# Patient Record
Sex: MALE | Race: WHITE | NOT HISPANIC OR LATINO | Employment: FULL TIME | URBAN - METROPOLITAN AREA
[De-identification: names, ages, dates, MRNs, and addresses within clinical notes are randomized per-mention and may not be internally consistent; named-entity substitution may affect disease eponyms.]

---

## 2020-12-07 ENCOUNTER — OFFICE VISIT (OUTPATIENT)
Dept: FAMILY MEDICINE CLINIC | Facility: CLINIC | Age: 35
End: 2020-12-07

## 2020-12-07 VITALS
RESPIRATION RATE: 16 BRPM | BODY MASS INDEX: 24.62 KG/M2 | TEMPERATURE: 97.3 F | OXYGEN SATURATION: 97 % | HEART RATE: 78 BPM | HEIGHT: 70 IN | DIASTOLIC BLOOD PRESSURE: 76 MMHG | WEIGHT: 172 LBS | SYSTOLIC BLOOD PRESSURE: 123 MMHG

## 2020-12-07 DIAGNOSIS — Z72.821 INADEQUATE SLEEP HYGIENE: ICD-10-CM

## 2020-12-07 DIAGNOSIS — G47.10 HYPERSOMNIA: ICD-10-CM

## 2020-12-07 DIAGNOSIS — R07.89 FEELING OF CHEST TIGHTNESS: ICD-10-CM

## 2020-12-07 DIAGNOSIS — G47.8 NON-RESTORATIVE SLEEP: ICD-10-CM

## 2020-12-07 DIAGNOSIS — Z23 NEED FOR INFLUENZA VACCINATION: ICD-10-CM

## 2020-12-07 DIAGNOSIS — Z00.00 ANNUAL PHYSICAL EXAM: Primary | ICD-10-CM

## 2020-12-07 PROCEDURE — 90686 IIV4 VACC NO PRSV 0.5 ML IM: CPT | Performed by: FAMILY MEDICINE

## 2020-12-07 PROCEDURE — 99385 PREV VISIT NEW AGE 18-39: CPT | Performed by: FAMILY MEDICINE

## 2020-12-07 PROCEDURE — 90471 IMMUNIZATION ADMIN: CPT | Performed by: FAMILY MEDICINE

## 2020-12-07 PROCEDURE — 93000 ELECTROCARDIOGRAM COMPLETE: CPT | Performed by: FAMILY MEDICINE

## 2020-12-07 RX ORDER — IBUPROFEN 200 MG
200 TABLET ORAL EVERY 6 HOURS PRN
COMMUNITY

## 2020-12-07 NOTE — PROGRESS NOTES
Subjective   Holland Lozano is a 35 y.o. male.     History of Present Illness     35-year-old male who is a new patient to me presents today for annual physical exam.    Diet/Exercise: Diet pretty balanced; active at work     Social history: Former smoker who quit in 2014; no alcohol or drug use; h/o opioid use in 2015 for 5-6 years total; quit in 2015.     Sexual history: Not currently sexually active; when active is active with women; no partners in the last year; no concern for STD testing     Chest pain/tightness x 3 weeks; L sided; thinks has to do with caffeine. Also drinks energy drinks once a day 8 oz of Red Bull. Was also drinking Monster drinks at time. Thinks he overdid it. Stopped energy drinks about 1.5 weeks ago; still drinking coffee. Drinking 2 cups of coffee in the morning only now. However still having chest tightness. For the past couple weeks its been a couple times a day; unsure of triggers. Works at [employer]; takes concrete steps to get exercise; the other morning he walked up the steps 10 flights and then had palpitations and some chest tightness. Episodes can last up to a couple of hours. Does not ever wake from sleep.     Sleep: Bedtime - 3998-3014, sleep latency - up to 1 hour; computer and TV in bedroom, wake time - 0530, hours slept: 4-6 hours during the week and sleeps longer on the weekend, non restorative sleep - Y, snoring - Y, witnessed apneas - N, nighttime awakening secondary to coughing/choking/gasping for air-N, kicking legs-N, morning headaches-Y, dry mouth-Y, urge sensations-N, excessive sweating-N, teeth grinding-Y, feel more sleepy after getting more sleep-Y, family history of ZACHARY-N.      No flowsheet data found.    The following portions of the patient's history were reviewed and updated as appropriate: allergies, current medications, past family history, past medical history, past social history, past surgical history and problem list.    Review of Systems    Constitutional: Negative for activity change, appetite change, chills and fever.   HENT: Negative for congestion, ear pain, sinus pressure and sore throat.    Respiratory: Negative for cough and shortness of breath.    Cardiovascular: Negative for chest pain.   Gastrointestinal: Negative for abdominal pain, blood in stool, constipation and diarrhea.   Musculoskeletal: Negative for back pain.   Psychiatric/Behavioral: Negative for stress. The patient is not nervous/anxious.        Objective   Physical Exam  Constitutional:       Appearance: He is well-developed.   HENT:      Head: Normocephalic and atraumatic.      Right Ear: External ear normal.      Left Ear: External ear normal.      Nose: Nose normal.   Eyes:      Conjunctiva/sclera: Conjunctivae normal.      Pupils: Pupils are equal, round, and reactive to light.   Neck:      Musculoskeletal: Normal range of motion and neck supple.   Cardiovascular:      Rate and Rhythm: Normal rate and regular rhythm.      Heart sounds: No murmur.   Pulmonary:      Effort: Pulmonary effort is normal. No respiratory distress.      Breath sounds: Normal breath sounds. No stridor. No wheezing or rales.   Chest:      Chest wall: No tenderness.   Abdominal:      General: Bowel sounds are normal. There is no distension.      Palpations: Abdomen is soft.      Tenderness: There is no abdominal tenderness.   Musculoskeletal: Normal range of motion.   Skin:     General: Skin is warm.   Neurological:      Mental Status: He is alert and oriented to person, place, and time.   Psychiatric:         Behavior: Behavior normal.       ECG 12 Lead    Date/Time: 12/7/2020 11:59 AM  Performed by: Steffen Robles MD  Authorized by: Steffen Robles MD   Comparison: not compared with previous ECG   Previous ECG: no previous ECG available  Rhythm: sinus rhythm  Rate: normal  Conduction: conduction normal  QRS axis: normal    Clinical impression: normal ECG                      Assessment/Plan      Diagnoses and all orders for this visit:    1. Annual physical exam (Primary)  -     CBC & Differential  -     Comprehensive Metabolic Panel  -     Hemoglobin A1c  -     Lipid Panel  -     TSH Rfx On Abnormal To Free T4  -     Chlamydia trachomatis, Neisseria gonorrhoeae, PCR - Urine, Urine, Clean Catch  -     RPR, Rfx Qn RPR / Confirm TP  -     HIV-1 / O / 2 Ag / Antibody 4th Generation  -     HSV 1 & 2 - Specific Antibody, IgG  -     ECG 12 Lead    2. Hypersomnia  -     Home Sleep Study; Future    3. Non-restorative sleep  -     Home Sleep Study; Future    4. Feeling of chest tightness  -     ECG 12 Lead    5. Inadequate sleep hygiene    6. Need for influenza vaccination  -     Fluarix/Fluzone/Afluria Quad>6 Months    Decrease caffeine intake, increase water intake, increase hours of sleep, improve sleep hygiene with no electronics in bedroom.    Low glycemic index diet  Exercise 30 minutes most days of the week  Make sure you get results on any labs or tests we ordered today  We discussed medications and how to take them as prescribed  Sleep 6-8 hours each night if possible  If you have not signed up for [employer], please activate your code ASAP from your After Visit Summary today    LDL goal <100  LDL goal if heart disease <70  HDL goal >60  Triglyceride goal <150  BP goal =<130/80  Fasting glucose <100

## 2020-12-11 LAB
C TRACH RRNA SPEC QL NAA+PROBE: NEGATIVE
N GONORRHOEA RRNA SPEC QL NAA+PROBE: NEGATIVE

## 2020-12-15 LAB
ALBUMIN SERPL-MCNC: 5 G/DL (ref 4–5)
ALBUMIN/GLOB SERPL: 2 {RATIO} (ref 1.2–2.2)
ALP SERPL-CCNC: 69 IU/L (ref 39–117)
ALT SERPL-CCNC: 20 IU/L (ref 0–44)
AST SERPL-CCNC: 20 IU/L (ref 0–40)
BASOPHILS # BLD AUTO: 0.1 X10E3/UL (ref 0–0.2)
BASOPHILS NFR BLD AUTO: 1 %
BILIRUB SERPL-MCNC: 0.5 MG/DL (ref 0–1.2)
BUN SERPL-MCNC: 12 MG/DL (ref 6–20)
BUN/CREAT SERPL: 14 (ref 9–20)
CALCIUM SERPL-MCNC: 9.6 MG/DL (ref 8.7–10.2)
CHLORIDE SERPL-SCNC: 101 MMOL/L (ref 96–106)
CHOLEST SERPL-MCNC: 176 MG/DL (ref 100–199)
CO2 SERPL-SCNC: 27 MMOL/L (ref 20–29)
CREAT SERPL-MCNC: 0.83 MG/DL (ref 0.76–1.27)
EOSINOPHIL # BLD AUTO: 0.1 X10E3/UL (ref 0–0.4)
EOSINOPHIL NFR BLD AUTO: 2 %
ERYTHROCYTE [DISTWIDTH] IN BLOOD BY AUTOMATED COUNT: 12.4 % (ref 11.6–15.4)
GLOBULIN SER CALC-MCNC: 2.5 G/DL (ref 1.5–4.5)
GLUCOSE SERPL-MCNC: 80 MG/DL (ref 65–99)
HBA1C MFR BLD: 5.1 % (ref 4.8–5.6)
HCT VFR BLD AUTO: 42.7 % (ref 37.5–51)
HDLC SERPL-MCNC: 60 MG/DL
HGB BLD-MCNC: 14 G/DL (ref 13–17.7)
HIV 1+2 AB+HIV1 P24 AG SERPL QL IA: NON REACTIVE
HSV1 IGG SER IA-ACNC: <0.91 INDEX (ref 0–0.9)
HSV2 IGG SER IA-ACNC: <0.91 INDEX (ref 0–0.9)
IMM GRANULOCYTES # BLD AUTO: 0 X10E3/UL (ref 0–0.1)
IMM GRANULOCYTES NFR BLD AUTO: 0 %
LDLC SERPL CALC-MCNC: 109 MG/DL (ref 0–99)
LYMPHOCYTES # BLD AUTO: 2.1 X10E3/UL (ref 0.7–3.1)
LYMPHOCYTES NFR BLD AUTO: 36 %
MCH RBC QN AUTO: 29 PG (ref 26.6–33)
MCHC RBC AUTO-ENTMCNC: 32.8 G/DL (ref 31.5–35.7)
MCV RBC AUTO: 88 FL (ref 79–97)
MONOCYTES # BLD AUTO: 0.5 X10E3/UL (ref 0.1–0.9)
MONOCYTES NFR BLD AUTO: 8 %
NEUTROPHILS # BLD AUTO: 3.2 X10E3/UL (ref 1.4–7)
NEUTROPHILS NFR BLD AUTO: 53 %
PLATELET # BLD AUTO: 267 X10E3/UL (ref 150–450)
POTASSIUM SERPL-SCNC: 4.5 MMOL/L (ref 3.5–5.2)
PROT SERPL-MCNC: 7.5 G/DL (ref 6–8.5)
RBC # BLD AUTO: 4.83 X10E6/UL (ref 4.14–5.8)
RPR SER QL: NON REACTIVE
SODIUM SERPL-SCNC: 139 MMOL/L (ref 134–144)
SPECIMEN STATUS: NORMAL
TRIGL SERPL-MCNC: 35 MG/DL (ref 0–149)
TSH SERPL DL<=0.005 MIU/L-ACNC: 1.6 UIU/ML (ref 0.45–4.5)
VLDLC SERPL CALC-MCNC: 7 MG/DL (ref 5–40)
WBC # BLD AUTO: 5.9 X10E3/UL (ref 3.4–10.8)

## 2021-01-12 ENCOUNTER — APPOINTMENT (OUTPATIENT)
Dept: SLEEP MEDICINE | Facility: HOSPITAL | Age: 36
End: 2021-01-12

## 2021-03-08 ENCOUNTER — TELEPHONE (OUTPATIENT)
Dept: FAMILY MEDICINE CLINIC | Facility: CLINIC | Age: 36
End: 2021-03-08

## 2021-03-08 NOTE — TELEPHONE ENCOUNTER
Caller: Holland Lozano    Relationship to patient: Self    Best call back number:[phone]    Patient is needing: Patient called in and requested a copy of his lab results. He said he was there in January and wanted a copy mailed to him. Please call patient and advise.

## 2021-09-10 ENCOUNTER — HOSPITAL ENCOUNTER (EMERGENCY)
Facility: HOSPITAL | Age: 36
Discharge: HOME | End: 2021-09-10
Attending: EMERGENCY MEDICINE | Admitting: EMERGENCY MEDICINE

## 2021-09-10 VITALS
TEMPERATURE: 98.4 F | SYSTOLIC BLOOD PRESSURE: 113 MMHG | RESPIRATION RATE: 16 BRPM | HEART RATE: 81 BPM | WEIGHT: 180 LBS | DIASTOLIC BLOOD PRESSURE: 68 MMHG | HEIGHT: 70 IN | BODY MASS INDEX: 25.77 KG/M2 | OXYGEN SATURATION: 99 %

## 2021-09-10 DIAGNOSIS — S81.812A LACERATION OF LEFT LEG, INITIAL ENCOUNTER: Primary | ICD-10-CM

## 2021-09-10 PROCEDURE — 90715 TDAP VACCINE 7 YRS/> IM: CPT | Performed by: NURSE PRACTITIONER

## 2021-09-10 PROCEDURE — 25010000002 TDAP 5-2.5-18.5 LF-MCG/0.5 SUSPENSION: Performed by: NURSE PRACTITIONER

## 2021-09-10 PROCEDURE — 90471 IMMUNIZATION ADMIN: CPT | Performed by: NURSE PRACTITIONER

## 2021-09-10 PROCEDURE — 99284 EMERGENCY DEPT VISIT MOD MDM: CPT

## 2021-09-10 PROCEDURE — 63710000001 ONDANSETRON ODT 4 MG TABLET DISPERSIBLE: Performed by: NURSE PRACTITIONER

## 2021-09-10 RX ORDER — CEPHALEXIN 500 MG/1
500 CAPSULE ORAL 3 TIMES DAILY
Qty: 15 CAPSULE | Refills: 0 | Status: SHIPPED | OUTPATIENT
Start: 2021-09-10 | End: 2021-09-15

## 2021-09-10 RX ORDER — ONDANSETRON 4 MG/1
4 TABLET, ORALLY DISINTEGRATING ORAL ONCE
Status: COMPLETED | OUTPATIENT
Start: 2021-09-10 | End: 2021-09-10

## 2021-09-10 RX ORDER — HYDROCODONE BITARTRATE AND ACETAMINOPHEN 7.5; 325 MG/1; MG/1
1 TABLET ORAL ONCE
Status: COMPLETED | OUTPATIENT
Start: 2021-09-10 | End: 2021-09-10

## 2021-09-10 RX ORDER — LIDOCAINE HYDROCHLORIDE AND EPINEPHRINE 10; 10 MG/ML; UG/ML
10 INJECTION, SOLUTION INFILTRATION; PERINEURAL ONCE
Status: COMPLETED | OUTPATIENT
Start: 2021-09-10 | End: 2021-09-10

## 2021-09-10 RX ORDER — IBUPROFEN 800 MG/1
800 TABLET ORAL EVERY 8 HOURS PRN
Qty: 30 TABLET | Refills: 0 | Status: SHIPPED | OUTPATIENT
Start: 2021-09-10

## 2021-09-10 RX ADMIN — LIDOCAINE HYDROCHLORIDE,EPINEPHRINE BITARTRATE 10 ML: 10; .01 INJECTION, SOLUTION INFILTRATION; PERINEURAL at 11:41

## 2021-09-10 RX ADMIN — ONDANSETRON 4 MG: 4 TABLET, ORALLY DISINTEGRATING ORAL at 11:58

## 2021-09-10 RX ADMIN — TETANUS TOXOID, REDUCED DIPHTHERIA TOXOID AND ACELLULAR PERTUSSIS VACCINE, ADSORBED 0.5 ML: 5; 2.5; 8; 8; 2.5 SUSPENSION INTRAMUSCULAR at 12:01

## 2021-09-10 RX ADMIN — HYDROCODONE BITARTRATE AND ACETAMINOPHEN 1 TABLET: 7.5; 325 TABLET ORAL at 11:58

## 2021-09-10 NOTE — ED NOTES
Per EMS, pt was opening a box at work and sliced his left knee with a .   LAC around 2 inches. Bleeding controlled at this time.     Patient was placed in face mask during triage process. Patient was wearing facemask when I entered the room and throughout our encounter. I wore full protective equipment throughout this patient encounter including a face mask, eye protection, and gloves. Hand hygiene was performed before donning protective equipment and again following doffing of PPE after leaving the room.       Elissa Richard RN  09/10/21 7847

## 2021-09-10 NOTE — ED NOTES
This RN in appropriate ppe while in pt room. Pt wearing mask.        Selam Borrero, RN  09/10/21 9626

## 2021-09-10 NOTE — ED NOTES
This RN in appropriate ppe while in pt room. Pt wearing mask.        Selam Borrero, RN  09/10/21 2658

## 2021-09-10 NOTE — DISCHARGE INSTRUCTIONS
Use knee immobilizer until stitches are removed.  You can take it off for bathing and for rest periods    Can return to work on Monday but no bending of left knee    1.  Keep initial dressing in place for 24 hours if able.  (if blood seeps through, then remove this dressing, wash gently with antibacterial soap and pat dry)    2.  After initial 24 hours wash the wound twice a day with antibacterial soap and apply thin film of over the counter triple antibiotic ointment (bacitracin or neosporin ointment)    3.  Cover with bandaid while at work    4.  Sutures out in 10 days.    Return Precautions    Although you are being discharged from the ED today, I encourage you to return for worsening symptoms.  Things can, and do, change such that treatment at home with medication may not be adequate.      Specifically, return for any of the following:    Chest pain, shortness of breath, pain or nausea and vomiting not controlled by medications provided.    Please make a follow up with your Primary Care Provider for a blood pressure recheck.

## 2021-09-10 NOTE — ED NOTES
antibiotic ointment applied to pt sutures, dressing placed and knee immobilizer applied. goulds form completed.      Selam Borrero RN  09/10/21 4336

## 2021-09-10 NOTE — ED PROVIDER NOTES
EMERGENCY DEPARTMENT ENCOUNTER    Room Number:  A01/01  Date seen:  9/10/2021  Time seen: 11:37 EDT  PCP: Steffen Robles MD  Historian: patient, EMS    HPI:  Chief complaint:left leg laceration  A complete HPI/ROS/PMH/PSH/SH/FH are unobtainable due to: n/a  Context:Holland Lozano is a 36 y.o. male who presents to the ED with c/o moderate left lower extremity laceration below the knee prior to arrival due to a  injury at work.  Pain is described as moderate and not made better or worse by anything.  He has not had this injury before.  He is unsure of his last known tetanus status.  He denies any loss of sensation or motor function.    Patient was placed in face mask in first look. Patient was wearing facemask when I entered the room and throughout our encounter. I wore full protective equipment throughout this patient encounter including a face mask, eye shield and gloves. Hand hygiene/washing of hands was performed before donning protective equipment and after removal when leaving the room.      MEDICAL RECORD REVIEW    ALLERGIES  Patient has no known allergies.    PAST MEDICAL HISTORY  Active Ambulatory Problems     Diagnosis Date Noted   • No Active Ambulatory Problems     Resolved Ambulatory Problems     Diagnosis Date Noted   • No Resolved Ambulatory Problems     Past Medical History:   Diagnosis Date   • Headache        PAST SURGICAL HISTORY  History reviewed. No pertinent surgical history.    FAMILY HISTORY  Family History   Problem Relation Age of Onset   • Hypertension Father    • Heart attack Maternal Uncle        SOCIAL HISTORY  Social History     Socioeconomic History   • Marital status: Single     Spouse name: Not on file   • Number of children: Not on file   • Years of education: Not on file   • Highest education level: Not on file   Tobacco Use   • Smoking status: Former Smoker     Quit date:      Years since quittin.6   • Smokeless tobacco: Never Used   Substance and Sexual Activity    • Alcohol use: Not Currently   • Drug use: Not Currently     Comment: Last used in 2015       REVIEW OF SYSTEMS  Review of Systems    All systems reviewed and negative except for those discussed in HPI.     PHYSICAL EXAM    ED Triage Vitals [09/10/21 1131]   Temp Heart Rate Resp BP SpO2   98.4 °F (36.9 °C) 68 16 110/72 99 %      Temp src Heart Rate Source Patient Position BP Location FiO2 (%)   -- -- -- -- --     Physical Exam  Musculoskeletal:        Legs:       Comments: 7 cm laceration as documented.  Pedal pulses palpable.  No bony tenderness or loss of sensation           I have reviewed the triage vital signs and nursing notes.      GENERAL: not distressed  HENT: nares patent  EYES: no scleral icterus  NECK: no ROM limitations  CV: regular rhythm, regular rate, no murmur,no rubs, no gallups  RESPIRATORY: normal effort, CTAB  ABDOMEN: soft  : deferred  MUSCULOSKELETAL: see diagram  NEURO: alert, moves all extremities, follows commands  SKIN: warm, dry    Laceration Repair    Date/Time: 9/10/2021 12:35 PM  Performed by: Grecia Douglas APRN  Authorized by: Dimas Tierney II, MD     Consent:     Consent obtained:  Verbal    Consent given by:  Patient    Risks discussed:  Infection, pain, poor cosmetic result and poor wound healing    Alternatives discussed:  No treatment  Anesthesia (see MAR for exact dosages):     Anesthesia method:  Local infiltration    Local anesthetic:  Lidocaine 1% WITH epi  Laceration details:     Location:  Leg    Leg location:  L lower leg    Length (cm):  7  Repair type:     Repair type:  Intermediate  Pre-procedure details:     Preparation:  Patient was prepped and draped in usual sterile fashion  Exploration:     Wound exploration: wound explored through full range of motion and entire depth of wound probed and visualized      Wound extent: fascia violated      Wound extent: no nerve damage noted, no tendon damage noted and no underlying fracture noted      Contaminated:  no    Treatment:     Area cleansed with:  Haydee    Amount of cleaning:  Extensive    Irrigation solution:  Sterile saline    Irrigation volume:  100    Irrigation method:  Pressure wash    Visualized foreign bodies/material removed: no    Fascia repair:     Suture size:  3-0    Suture material:  Vicryl    Suture technique:  Simple interrupted    Number of sutures:  2  Subcutaneous repair:     Suture size:  3-0    Suture material:  Vicryl    Suture technique:  Simple interrupted    Number of sutures:  4  Skin repair:     Repair method:  Sutures    Suture size:  4-0    Suture material:  Nylon    Suture technique:  Horizontal mattress    Number of sutures:  11  Approximation:     Approximation:  Close  Post-procedure details:     Dressing:  Antibiotic ointment, bulky dressing and splint for protection    Patient tolerance of procedure:  Tolerated well, no immediate complications        LAB RESULTS  No results found for this or any previous visit (from the past 24 hour(s)).      RADIOLOGY RESULTS  No orders to display         PROGRESS, DATA ANALYSIS, CONSULTS AND MEDICAL DECISION MAKING  All labs have been independently reviewed by me.  All radiology studies have been reviewed by me and discussed with radiologist dictating the report.  EKG's independently viewed and interpreted by me unless stated otherwise. Discussion below represents my analysis of pertinent findings related to patient's condition, differential diagnosis, treatment plan and final disposition.        DDX: LLE laceration, tendon injury, muscle injury    MDM:  Lac repair completed without issue.  No loss of sensory or motor function.  Placed patient in knee immobilizer.  Will do abx for a few days.       Reviewed pt's history and workup with Dr. Tierney.  After a bedside evaluation, Dr. Tierney agrees with the plan of care.    The patient's history, physical exam, and lab findings were discussed with the physician, who also performed a face to face  "history and physical exam.  I discussed all results and noted any abnormalities with patient.  Discussed absoute need to recheck abnormalities with their family physician.  I answered any of the patient's questions.  Discussed plan for discharge, as there is no emergent indication for admission.  Pt is agreeable and understands need for follow up and repeat testing.  Pt is aware that discharge does not mean that nothing is wrong but it indicates no emergency is present and they must continue care with their family physician.  Pt is discharged with instructions to follow up with primary care doctor to have their blood pressure rechecked.         Disposition vitals:  /68   Pulse 81   Temp 98.4 °F (36.9 °C)   Resp 16   Ht 177.8 cm (70\")   Wt 81.6 kg (180 lb)   SpO2 99%   BMI 25.83 kg/m²       DIAGNOSIS  Final diagnoses:   Laceration of left leg, initial encounter       FOLLOW UP   Steffen Robles MD  [address] [facility] [address]  [facility] [address]  [phone]    Schedule an appointment as soon as possible for a visit     i     Grecia Douglas, APRN  09/10/21 1241    "

## 2021-09-13 NOTE — ED PROVIDER NOTES
MD ATTESTATION NOTE    The ASHTYN and I have discussed this patient's history, physical exam, and treatment plan.  I have reviewed the documentation and personally had a face to face interaction with the patient. I affirm the documentation and agree with the treatment and plan.  The attached note describes my personal findings.      Holland Lozano is a 36 y.o. male who presents to the ED c/o left leg laceration.  This occurred due to a  injury at work.      On exam:  7 cm laceration to the left lower leg    Labs  No results found for this or any previous visit (from the past 24 hour(s)).    Radiology  No Radiology Exams Resulted Within Past 24 Hours    Medical Decision Making:           PPE: Both the patient and I wore a surgical mask throughout the entire patient encounter. I wore protective goggles.     Diagnosis  Final diagnoses:   Laceration of left leg, initial encounter        Dimas Tierney II, MD  09/12/21 5788

## 2021-09-21 ENCOUNTER — HOSPITAL ENCOUNTER (EMERGENCY)
Facility: HOSPITAL | Age: 36
Discharge: HOME | End: 2021-09-21
Attending: EMERGENCY MEDICINE | Admitting: EMERGENCY MEDICINE

## 2021-09-21 VITALS
TEMPERATURE: 98.2 F | HEART RATE: 79 BPM | SYSTOLIC BLOOD PRESSURE: 112 MMHG | RESPIRATION RATE: 18 BRPM | OXYGEN SATURATION: 98 % | DIASTOLIC BLOOD PRESSURE: 74 MMHG

## 2021-09-21 DIAGNOSIS — Z48.02 VISIT FOR SUTURE REMOVAL: Primary | ICD-10-CM

## 2021-09-21 PROCEDURE — 99202 OFFICE O/P NEW SF 15 MIN: CPT

## 2021-09-21 NOTE — DISCHARGE INSTRUCTIONS
Return to the ER with any complications or should you have any further concerns.     <--- Click to Launch ICDx for PreOp, PostOp and Procedure

## 2021-09-21 NOTE — ED PROVIDER NOTES
MD ATTESTATION NOTE  Patient was placed in face mask in first look and the following protective measures were taken unless documented below in the ED course. Patient was wearing facemask when I entered the room and throughout our encounter. I wore full protective equipment throughout this patient encounter including a N95 face mask, googles, gown and gloves. Hand hygiene was performed before donning protective equipment and after removal when leaving the room.    The ASHTYN and I have discussed this patient's history, physical exam, and treatment plan. I have reviewed the documentation and personally had a face to face interaction with the patient. I affirm the ASHTYN documentation and agree with their diagnostics, findings, treatment, plan, and disposition.  The attached note describes my personal findings.    Holland Lozano is a 36 y.o. male who presents to the ED c/o suture removal.  Patient reports that he was seen in this emergency department 11 days prior, had a laceration on his knee which was repaired at that time.  Patient was on oral antibiotics for 5 days, took them without complication.  Patient reports that he did have some bleeding initially right after he received his stitches but denies any recent bleeding, no redness warmth or swelling, no drainage.  Patient denies any fever shakes chills or night sweats.    On exam:  General: NAD.  Head: NCAT.  ENT: EOMI, PERRLA, MMM.  Neck: Supple, trachea midline.  Cardiac: regular rate and rhythm.  Lungs: CTAB.  Abdomen: Soft, NTTP, no rebound tenderness/guarding/rigidity.   : Deferred to ASHTYN.  Extremities: Moves all extremities well, no peripheral edema.  Laceration and right knee, well-healed.  Sutures are removed.  No wound dehiscence, no signs of infection.  Skin: Warm, dry.    Medical Decision Making:  After the initial H&P, I discussed pertinent information from history and physical exam with patient/family.  Discussed differential diagnosis.  Discussed plan for  ED evaluation/work-up/treatment.  All questions answered.  Patient/family is agreeable with plan.         Diagnosis  Final diagnoses:   Visit for suture removal        Clemente Guaman MD  09/21/21 1946

## 2021-09-21 NOTE — ED PROVIDER NOTES
EMERGENCY DEPARTMENT ENCOUNTER    Room Number:  A02/02  Date of encounter:  2021  PCP: Steffen Robles MD  Historian: Patient      HPI:  Chief Complaint: Suture removal  A complete HPI/ROS/PMH/PSH/SH/FH are unobtainable due to: Nothing    Context: Holland Lozano is a 36 y.o. male who presents to the ED c/o he some sutures removed that were placed 11 days ago in the left lower extremity just medial and superior to the left knee.  Patient states the laceration was sustained using a  while at work.  He states he has had no complications with the laceration it appears to heal without difficulty.  He denies discharge, fever, chills, erythema and warmth at the laceration site.    He is here for further evaluation      PAST MEDICAL HISTORY  Active Ambulatory Problems     Diagnosis Date Noted   • No Active Ambulatory Problems     Resolved Ambulatory Problems     Diagnosis Date Noted   • No Resolved Ambulatory Problems     Past Medical History:   Diagnosis Date   • Headache          PAST SURGICAL HISTORY  No past surgical history on file.      FAMILY HISTORY  Family History   Problem Relation Age of Onset   • Hypertension Father    • Heart attack Maternal Uncle          SOCIAL HISTORY  Social History     Socioeconomic History   • Marital status: Single     Spouse name: Not on file   • Number of children: Not on file   • Years of education: Not on file   • Highest education level: Not on file   Tobacco Use   • Smoking status: Former Smoker     Quit date:      Years since quittin.7   • Smokeless tobacco: Never Used   Substance and Sexual Activity   • Alcohol use: Not Currently   • Drug use: Not Currently     Comment: Last used in          ALLERGIES  Patient has no known allergies.        REVIEW OF SYSTEMS  Review of Systems   Constitutional: Negative for chills and fever.   Skin: Negative for rash.        All systems reviewed and negative except for those discussed in HPI.       PHYSICAL EXAM    I have  reviewed the triage vital signs and nursing notes.    ED Triage Vitals   Temp Heart Rate Resp BP SpO2   09/21/21 1843 09/21/21 1843 09/21/21 1843 09/21/21 1918 09/21/21 1843   98.2 °F (36.8 °C) 79 18 112/74 98 %      Temp src Heart Rate Source Patient Position BP Location FiO2 (%)   09/21/21 1843 09/21/21 1843 -- -- --   Tympanic Monitor          Physical Exam  GENERAL: WDWN male in no acute distress  HENT: nares patent  EYES: no scleral icterus  CV: regular rhythm, regular rate  RESPIRATORY: normal effort  ABDOMEN: soft  MUSCULOSKELETAL: no deformity  NEURO: alert, moves all extremities, follows commands  SKIN: Well approximated well-healed approximate 7 cm laceration to the medial aspect of the left lower extremity just above the knee.        LAB RESULTS  No results found for this or any previous visit (from the past 24 hour(s)).    Ordered the above labs and independently reviewed the results.        RADIOLOGY  No Radiology Exams Resulted Within Past 24 Hours    I ordered the above noted radiological studies. Reviewed by me and discussed with radiologist.  See dictation for official radiology interpretation.      PROCEDURES    Suture Removal    Date/Time: 9/21/2021 7:33 PM  Performed by: Ravindra Austin III PA  Authorized by: Clemente Guaman MD     Consent:     Consent obtained:  Verbal    Consent given by:  Patient    Risks discussed:  Bleeding and pain    Alternatives discussed:  No treatment  Location:     Location:  Lower extremity  Procedure details:     Wound appearance:  No signs of infection and clean    Number of sutures removed:  11  Post-procedure details:     Post-procedure assessment: 4 inch Ace wrap applied.    Patient tolerance of procedure:  Tolerated well, no immediate complications          MEDICATIONS GIVEN IN ER    Medications - No data to display      PROGRESS, DATA ANALYSIS, CONSULTS, AND MEDICAL DECISION MAKING    All labs have been independently reviewed by me.  All radiology  studies have been reviewed by me and discussed with radiologist dictating the report.   EKG's independently viewed and interpreted by me.  Discussion below represents my analysis of pertinent findings related to patient's condition, differential diagnosis, treatment plan and final disposition.    Working diagnosis suture removal.  Wound looks excellent and well-healed.  Please see procedure note for details of suture removal.         Patient was placed in face mask in first look. Patient was wearing facemask when I entered the room and throughout our encounter. I wore full protective equipment throughout this patient encounter including a N-95 face mask, eye shield, gown and gloves. Hand hygiene was performed before donning protective equipment and after removal when leaving the room.    AS OF 19:57 EDT VITALS:    BP - 112/74  HR - 79  TEMP - 98.2 °F (36.8 °C) (Tympanic)  O2 SATS - 98%        DIAGNOSIS  Final diagnoses:   Visit for suture removal         DISPOSITION  DISCHARGE    Patient discharged in stable condition.    Reviewed implications of results, diagnosis, meds, responsibility to follow up, warning signs and symptoms of possible worsening, potential complications and reasons to return to ER.    Patient/Family voiced understanding of above instructions.    Discussed plan for discharge, as there is no emergent indication for admission. Patient referred to primary care provider for BP management due to today's BP. Pt/family is agreeable and understands need for follow up and repeat testing.  Pt is aware that discharge does not mean that nothing is wrong but it indicates no emergency is present that requires admission and they must continue care with follow-up as given below or physician of their choice.     FOLLOW-UP  Steffen Robles MD  [id number] [facility] [address]  [facility] [address] [phone]    Schedule an appointment as soon as possible for a visit   As needed         Medication List      No  changes were made to your prescriptions during this visit.                Ravindra Austin III, PA  09/21/21 1957

## 2021-09-21 NOTE — ED NOTES
Pt has no complaints and requests to have his sutures removed that were placed 10 days ago.      Yousif Mcnulty, RN  09/21/21 1957